# Patient Record
Sex: FEMALE | NOT HISPANIC OR LATINO | Employment: FULL TIME | URBAN - NONMETROPOLITAN AREA
[De-identification: names, ages, dates, MRNs, and addresses within clinical notes are randomized per-mention and may not be internally consistent; named-entity substitution may affect disease eponyms.]

---

## 2020-02-22 ENCOUNTER — OFFICE VISIT (OUTPATIENT)
Dept: URGENT CARE | Facility: PHYSICIAN GROUP | Age: 70
End: 2020-02-22
Payer: COMMERCIAL

## 2020-02-22 VITALS
DIASTOLIC BLOOD PRESSURE: 90 MMHG | BODY MASS INDEX: 25.48 KG/M2 | SYSTOLIC BLOOD PRESSURE: 130 MMHG | HEIGHT: 70 IN | HEART RATE: 87 BPM | OXYGEN SATURATION: 98 % | WEIGHT: 178 LBS | TEMPERATURE: 97.7 F

## 2020-02-22 DIAGNOSIS — J45.909 REACTIVE AIRWAY DISEASE WITHOUT COMPLICATION, UNSPECIFIED ASTHMA SEVERITY, UNSPECIFIED WHETHER PERSISTENT: ICD-10-CM

## 2020-02-22 PROCEDURE — 99203 OFFICE O/P NEW LOW 30 MIN: CPT | Performed by: FAMILY MEDICINE

## 2020-02-22 RX ORDER — ALBUTEROL SULFATE 90 UG/1
AEROSOL, METERED RESPIRATORY (INHALATION)
Qty: 1 INHALER | Refills: 2 | Status: SHIPPED | OUTPATIENT
Start: 2020-02-22 | End: 2023-03-15

## 2020-02-22 SDOH — HEALTH STABILITY: MENTAL HEALTH: HOW MANY STANDARD DRINKS CONTAINING ALCOHOL DO YOU HAVE ON A TYPICAL DAY?: 1 OR 2

## 2020-02-22 SDOH — HEALTH STABILITY: MENTAL HEALTH: HOW OFTEN DO YOU HAVE A DRINK CONTAINING ALCOHOL?: 4 OR MORE TIMES A WEEK

## 2020-02-22 NOTE — PROGRESS NOTES
Chief Complaint:    Chief Complaint   Patient presents with   • Cough     Pt sts they are going to be doing work on her house and are tearing it up and there is mold in there. pt sts her cough is from a possible allergy to the mold in her house.Pt sts she stayed in a Hotel yesterday and was breathing better.        History of Present Illness:    This is a new problem. 5 months ago, she moved into a new residence and she found out there is mold in the house. She has had shortness of breath and cough when in her residence but is better when she is away from area. Yesterday, she stayed in a motel and feels improved, and currently normal. No fever. Not coughing up purulent mucus. She reports the insurance will pay for her house to be renovated and she will not return to house until is completed. She feels an MDI may be helpful to have for prn use.      Review of Systems:    Constitutional: Negative for fever, chills, and diaphoresis.   Eyes: Negative for change in vision, photophobia, pain, redness, and discharge.  ENT: Negative for ear pain, ear discharge, hearing loss, tinnitus, nasal congestion, nosebleeds, and sore throat.    Respiratory: See HPI.  Cardiovascular: Negative for chest pain, palpitations, orthopnea, claudication, leg swelling, and PND.   Gastrointestinal: Negative for abdominal pain, nausea, vomiting, diarrhea, constipation, blood in stool, and melena.   Genitourinary: Negative for dysuria, urinary urgency, urinary frequency, hematuria, and flank pain.   Musculoskeletal: Negative for myalgias, joint pain, neck pain, and back pain.   Skin: Negative for rash and itching.   Neurological: Negative for dizziness, tingling, tremors, sensory change, speech change, focal weakness, seizures, loss of consciousness, and headaches.   Endo: Negative for polydipsia.   Heme: Does not bruise/bleed easily.   Psychiatric/Behavioral: Negative for depression, suicidal ideas, hallucinations, memory loss and substance  abuse. The patient is not nervous/anxious and does not have insomnia.        Past Medical History:    History reviewed. No pertinent past medical history.    Past Surgical History:    History reviewed. No pertinent surgical history.    Social History:    Social History     Socioeconomic History   • Marital status: Unknown     Spouse name: Not on file   • Number of children: Not on file   • Years of education: Not on file   • Highest education level: Not on file   Occupational History   • Not on file   Social Needs   • Financial resource strain: Not on file   • Food insecurity     Worry: Not on file     Inability: Not on file   • Transportation needs     Medical: Not on file     Non-medical: Not on file   Tobacco Use   • Smoking status: Current Every Day Smoker     Packs/day: 1.00     Types: Cigarettes   • Smokeless tobacco: Never Used   Substance and Sexual Activity   • Alcohol use: Yes     Frequency: 4 or more times a week     Drinks per session: 1 or 2   • Drug use: Never   • Sexual activity: Not on file   Lifestyle   • Physical activity     Days per week: Not on file     Minutes per session: Not on file   • Stress: Not on file   Relationships   • Social connections     Talks on phone: Not on file     Gets together: Not on file     Attends Christianity service: Not on file     Active member of club or organization: Not on file     Attends meetings of clubs or organizations: Not on file     Relationship status: Not on file   • Intimate partner violence     Fear of current or ex partner: Not on file     Emotionally abused: Not on file     Physically abused: Not on file     Forced sexual activity: Not on file   Other Topics Concern   • Not on file   Social History Narrative   • Not on file     Family History:    History reviewed. No pertinent family history.    Medications:    No current outpatient medications on file prior to visit.     No current facility-administered medications on file prior to visit.   "    Allergies:    No Known Allergies      Vitals:    Vitals:    02/22/20 1333   BP: 130/90   BP Location: Left arm   Patient Position: Sitting   BP Cuff Size: Adult   Pulse: 87   Temp: 36.5 °C (97.7 °F)   TempSrc: Temporal   SpO2: 98%   Weight: 80.7 kg (178 lb)   Height: 1.778 m (5' 10\")         Physical Exam:    Constitutional: Vital signs reviewed. Appears well-developed and well-nourished. No acute distress.   Eyes: Sclera white, conjunctivae clear. .  ENT: External ears normal. Hearing normal.   Neck: Neck supple.   Cardiovascular: Regular rate and rhythm. No murmur.  Pulmonary/Chest: Respirations non-labored. Clear to auscultation bilaterally.  Musculoskeletal: Normal gait. Normal range of motion. No muscular atrophy or weakness.  Neurological: Alert and oriented to person, place, and time. Muscle tone normal. Coordination normal.   Skin: No rashes or lesions. Warm, dry, normal turgor.  Psychiatric: Normal mood and affect. Behavior is normal. Judgment and thought content normal.       Assessment / Plan:    1. Reactive airway disease without complication, unspecified asthma severity, unspecified whether persistent  - albuterol 108 (90 Base) MCG/ACT Aero Soln inhalation aerosol; 2 PUFFS EVERY 4 HOURS ONLY IF NEEDED FOR COUGH, WHEEZING, OR SHORTNESS OF BREATH.  Dispense: 1 Inhaler; Refill: 2      Discussed with her DDX, management options, and risks, benefits, and alternatives to treatment plan agreed upon.    Agreeable to medication prescribed.    Discussed expected course of duration, time for improvement, and to seek follow-up in Emergency Room, urgent care, or with PCP if getting worse at any time or not improving within expected time frame.  "

## 2020-03-04 ENCOUNTER — OFFICE VISIT (OUTPATIENT)
Dept: URGENT CARE | Facility: PHYSICIAN GROUP | Age: 70
End: 2020-03-04
Payer: COMMERCIAL

## 2020-03-04 VITALS
BODY MASS INDEX: 25.48 KG/M2 | OXYGEN SATURATION: 98 % | RESPIRATION RATE: 16 BRPM | DIASTOLIC BLOOD PRESSURE: 78 MMHG | HEIGHT: 70 IN | TEMPERATURE: 98.5 F | WEIGHT: 178 LBS | HEART RATE: 96 BPM | SYSTOLIC BLOOD PRESSURE: 124 MMHG

## 2020-03-04 DIAGNOSIS — J22 LOWER RESP. TRACT INFECTION: ICD-10-CM

## 2020-03-04 DIAGNOSIS — R06.2 WHEEZING: ICD-10-CM

## 2020-03-04 PROCEDURE — 99214 OFFICE O/P EST MOD 30 MIN: CPT | Performed by: PHYSICIAN ASSISTANT

## 2020-03-04 RX ORDER — AZITHROMYCIN 250 MG/1
TABLET, FILM COATED ORAL
Qty: 6 TAB | Refills: 0 | Status: SHIPPED | OUTPATIENT
Start: 2020-03-04 | End: 2023-03-15

## 2020-03-04 RX ORDER — METHYLPREDNISOLONE 4 MG/1
TABLET ORAL
Qty: 21 TAB | Refills: 0 | Status: SHIPPED | OUTPATIENT
Start: 2020-03-04 | End: 2023-03-15

## 2020-03-04 NOTE — PROGRESS NOTES
Chief Complaint   Patient presents with   • Cough       HISTORY OF PRESENT ILLNESS: Patient is a 69 y.o. female who presents today for the following:    Patient comes in for evaluation of a 2-day history of cough.  She complains of mild shortness of breath and chills.  She has had a runny nose but denies headache, ear pain, sore throat, overt fever or body aches.  She has not taken any over-the-counter medication today.    There are no active problems to display for this patient.      Allergies:Patient has no known allergies.    Current Outpatient Medications Ordered in Epic   Medication Sig Dispense Refill   • methylPREDNISolone (MEDROL DOSEPAK) 4 MG Tablet Therapy Pack Use as package directs 21 Tab 0   • azithromycin (ZITHROMAX) 250 MG Tab Use as package directs. Fill if symptoms worsen at any point OR if they fail to improve over the next 7-10 days 6 Tab 0   • albuterol 108 (90 Base) MCG/ACT Aero Soln inhalation aerosol 2 PUFFS EVERY 4 HOURS ONLY IF NEEDED FOR COUGH, WHEEZING, OR SHORTNESS OF BREATH. 1 Inhaler 2     No current Epic-ordered facility-administered medications on file.        History reviewed. No pertinent past medical history.    Social History     Tobacco Use   • Smoking status: Current Every Day Smoker     Packs/day: 1.00     Types: Cigarettes   • Smokeless tobacco: Never Used   Substance Use Topics   • Alcohol use: Yes     Frequency: 4 or more times a week     Drinks per session: 1 or 2   • Drug use: Never       No family status information on file.   History reviewed. No pertinent family history.    Review of Systems:   Constitutional ROS: No unexpected change in weight, No weakness, No fatigue  Eye ROS: No recent significant change in vision, No eye pain, redness, discharge  Ear ROS: No drainage, No tinnitus or vertigo, No recent change in hearing  Mouth/Throat ROS: No teeth or gum problems, No bleeding gums, No tongue complaints  Neck ROS: No swollen glands, No significant pain in  "neck  Pulmonary ROS: No chronic cough, sputum, or hemoptysis, No dyspnea on exertion, No wheezing  Cardiovascular ROS: No diaphoresis, No edema, No palpitations  Musculoskeletal/Extremities ROS: No peripheral edema, No pain, redness or swelling on the joints  Hematologic/Lymphatic ROS: No chills, No night sweats, No weight loss  Skin/Integumentary ROS: No edema, No evidence of rash, No itching      Exam:  /78 (BP Location: Right arm, Patient Position: Sitting, BP Cuff Size: Adult)   Pulse 96   Temp 36.9 °C (98.5 °F) (Temporal)   Resp 16   Ht 1.778 m (5' 10\")   Wt 80.7 kg (178 lb)   SpO2 98%   General: Well developed, well nourished. No distress.    Eye: PERRL/EOMI; conjunctivae clear, lids normal.  ENMT: Lips without lesions, MMM. Oropharynx is clear. Bilateral TMs are within normal limits.  Pulmonary: Unlabored respiratory effort.  Diffuse faint expiratory wheezes  Cardiovascular: Regular rate and rhythm without murmur.   Neurologic: Grossly nonfocal. No facial asymmetry noted.  Lymph: No cervical lymphadenopathy noted.  Skin: Warm, dry, good turgor. No rashes in visible areas.   Psych: Normal mood. Alert and oriented to person, place and time.    Assessment/Plan:  Discussed likely viral etiology.  Vitals and exam unremarkable.  Low suspicion for pneumonia. Discussed appropriate over-the-counter symptomatic medication, and when to return to clinic. Follow up for worsening or persistent symptoms.  1. Lower resp. tract infection  azithromycin (ZITHROMAX) 250 MG Tab   2. Wheezing  methylPREDNISolone (MEDROL DOSEPAK) 4 MG Tablet Therapy Pack       "

## 2020-03-04 NOTE — LETTER
March 4, 2020         Patient: Marivel Enamorado   YOB: 1950   Date of Visit: 3/4/2020           To Whom it May Concern:    Marivel Enamorado was seen in my clinic on 3/4/2020. She may return to work on 3/5/2020.    If you have any questions or concerns, please don't hesitate to call.        Sincerely,           Debi Powers P.A.-C.  Electronically Signed

## 2020-05-14 ENCOUNTER — APPOINTMENT (RX ONLY)
Dept: URBAN - METROPOLITAN AREA CLINIC 4 | Facility: CLINIC | Age: 70
Setting detail: DERMATOLOGY
End: 2020-05-14

## 2020-05-14 PROBLEM — C44.91 BASAL CELL CARCINOMA OF SKIN, UNSPECIFIED: Status: ACTIVE | Noted: 2020-05-14

## 2020-05-14 PROCEDURE — ? MOHS SURGERY PHONE CONSULTATION

## 2020-05-14 NOTE — PROCEDURE: MOHS SURGERY PHONE CONSULTATION
Has The Pathology Report Been Received?: Yes
Has The Patient Ever Had A Joint Replaced?: No
Detail Level: Simple
Patient Reported Location: [facility]
Referring Provider: GLORIA STINSON MD
Which Antibiotic Do They Take For Surgical Prophylaxis?: Amoxicillin (2 grams)
Patient's Insurance: P PPO
Office Location Of Mohs Surgery: [facility]
Pathology Accession #: [id number]
Patient Preferred Phone Number: [phone]

## 2023-03-15 ENCOUNTER — TELEPHONE (OUTPATIENT)
Dept: URGENT CARE | Facility: PHYSICIAN GROUP | Age: 73
End: 2023-03-15

## 2023-03-15 ENCOUNTER — OFFICE VISIT (OUTPATIENT)
Dept: URGENT CARE | Facility: PHYSICIAN GROUP | Age: 73
End: 2023-03-15
Payer: COMMERCIAL

## 2023-03-15 VITALS
WEIGHT: 182 LBS | SYSTOLIC BLOOD PRESSURE: 132 MMHG | DIASTOLIC BLOOD PRESSURE: 88 MMHG | TEMPERATURE: 97.4 F | BODY MASS INDEX: 26.05 KG/M2 | HEART RATE: 88 BPM | RESPIRATION RATE: 16 BRPM | OXYGEN SATURATION: 97 % | HEIGHT: 70 IN

## 2023-03-15 DIAGNOSIS — H10.9 BACTERIAL CONJUNCTIVITIS OF RIGHT EYE: ICD-10-CM

## 2023-03-15 PROCEDURE — 99213 OFFICE O/P EST LOW 20 MIN: CPT | Performed by: NURSE PRACTITIONER

## 2023-03-15 RX ORDER — MOXIFLOXACIN 5 MG/ML
1 SOLUTION/ DROPS OPHTHALMIC 3 TIMES DAILY
Qty: 2 ML | Refills: 0 | Status: SHIPPED | OUTPATIENT
Start: 2023-03-15 | End: 2023-03-22

## 2023-03-15 ASSESSMENT — ENCOUNTER SYMPTOMS
EYE PAIN: 1
PHOTOPHOBIA: 0
HEADACHES: 0
FEVER: 0
EYE REDNESS: 1
DIZZINESS: 0
CHILLS: 0
SINUS PAIN: 0
EYE DISCHARGE: 1
DOUBLE VISION: 0
SORE THROAT: 0
BLURRED VISION: 1

## 2023-03-15 NOTE — TELEPHONE ENCOUNTER
Patient requesting a work note as she's concerned about infecting others, or if you feel she  is okay to return to work. Please advise

## 2023-03-15 NOTE — PROGRESS NOTES
Patient has consented to treatment and for use of patient information for treatment and billing purposes.    Chief Complaint:    Chief Complaint   Patient presents with    Eye Problem     X1 day R eye pain, redness, and watery discharge         History of Present Illness: 72 y.o.  female presents to clinic with 1 day history of right eye redness, burning pain, blurred vision, and watery discharge.    Her left eye is currently normal.   She denies any other history of eye issues, she denies any trauma or injury to the eye.  She does report that a coworker had similar symptoms last week.  Chest reports that she has had a chronic cough due to smoking.    Review of Systems   Constitutional:  Negative for chills and fever.   HENT:  Positive for congestion. Negative for sinus pain and sore throat.    Eyes:  Positive for blurred vision, pain, discharge and redness. Negative for double vision and photophobia.   Neurological:  Negative for dizziness and headaches.     Medications, Allergies, and current problem list reviewed today in Epic.    Physical Exam:    Vitals:    03/15/23 1250   BP: 132/88   Pulse: 88   Resp: 16   Temp: 36.3 °C (97.4 °F)   SpO2: 97%             Physical Exam  HENT:      Right Ear: Tympanic membrane, ear canal and external ear normal.      Left Ear: Tympanic membrane, ear canal and external ear normal.      Mouth/Throat:      Mouth: Mucous membranes are moist.   Eyes:      Conjunctiva/sclera:      Right eye: Right conjunctiva is injected. No exudate.     Left eye: Left conjunctiva is not injected. No exudate.  Cardiovascular:      Rate and Rhythm: Normal rate.   Pulmonary:      Effort: Pulmonary effort is normal.      Breath sounds: Rhonchi present.   Neurological:      Mental Status: She is alert.          Medical Decision Making:  I personally reviewed prior external notes and test results pertinent to today's visit.   Shared decision-making was utilized with patient did develop treatment plan and  clinic course. Jay, 72 y.o. female,  presents to clinic with 1 day history of right eye redness, burning pain, blurred vision, and watery discharge.    Her left eye is currently normal.   She denies any other history of eye issues, she denies any trauma or injury to the eye.  She does report that a coworker had similar symptoms last week.  Chest reports that she has had a chronic cough due to smoking.    Symptoms are consistent with bacterial conjunctivitis of right eye.  Discussed with patient good hand hygiene and to use warm washcloths for any matting that may appear.  Prescription of mofloxacin sent to pharmacy as Polytrim has been backordered for several weeks.    The patient remained stable during the urgent care visit.    Plan:  OTC Tylenol or Motrin for fever/discomfort.  Avoid touching eyes  Hand Hygiene   Follow-up with PCP  AVS printed  Return to clinic or go to the ED if symptoms worsen or fail to improve, or if patient should develop worsening/increasing/persistent eye redness, eye drainage, eye pain, eye itchiness, vision changes, periorbital redness or swelling, headache, fever/chills, and/or any concerning symptoms.      1. Bacterial conjunctivitis of right eye  - moxifloxacin (VIGAMOX) 0.5 % Solution; Administer 1 Drop into the right eye 3 times a day for 7 days.  Dispense: 2 mL; Refill: 0          Verbal and/or printed education was provided regarding the assessment and diagnosis.  All of the patient's questions were answered to their satisfaction at the time of discharge.    Follow up:    Recommended f/u in  7-10 if there is no improvement.    Patient was encouraged to monitor symptoms closely. Those signs and symptoms which would warrant concern and mandate seeking a higher level of service through the emergency department discussed at length and included in discharge papers.  Patient stated agreement and understanding of this plan of care.    Disposition:  Home in stable condition        Voice Recognition Disclaimer:  Portions of this document were created using voice recognition software. The software does have a chance of producing errors of grammar and possibly content. I have made every reasonable attempt to correct obvious errors, but there may be errors of grammar and possibly content that I did not discover before finalizing the documentation.

## 2023-03-15 NOTE — LETTER
[facility] U[facility] [place] [address]  [facility] [address]     March 15, 2023    Patient: Marivel Enamorado   YOB: 1950   Date of Visit: 3/15/2023       To Whom It May Concern:    Marivel Enamorado was seen and treated in our department on 3/15/2023.  She will need to be excused to work due to illness and may return on 3/17/2023 without restrictions    Sincerely,     VENESSA Ho.

## 2024-02-19 ENCOUNTER — OFFICE VISIT (OUTPATIENT)
Dept: URGENT CARE | Facility: PHYSICIAN GROUP | Age: 74
End: 2024-02-19
Payer: COMMERCIAL

## 2024-02-19 VITALS
OXYGEN SATURATION: 96 % | HEIGHT: 70 IN | DIASTOLIC BLOOD PRESSURE: 82 MMHG | SYSTOLIC BLOOD PRESSURE: 124 MMHG | TEMPERATURE: 97.7 F | RESPIRATION RATE: 18 BRPM | WEIGHT: 188 LBS | HEART RATE: 88 BPM | BODY MASS INDEX: 26.92 KG/M2

## 2024-02-19 DIAGNOSIS — L08.9: ICD-10-CM

## 2024-02-19 DIAGNOSIS — S80.819A: ICD-10-CM

## 2024-02-19 PROCEDURE — 99213 OFFICE O/P EST LOW 20 MIN: CPT | Performed by: NURSE PRACTITIONER

## 2024-02-19 PROCEDURE — 3079F DIAST BP 80-89 MM HG: CPT | Performed by: NURSE PRACTITIONER

## 2024-02-19 PROCEDURE — 3074F SYST BP LT 130 MM HG: CPT | Performed by: NURSE PRACTITIONER

## 2024-02-19 RX ORDER — SULFAMETHOXAZOLE AND TRIMETHOPRIM 800; 160 MG/1; MG/1
1 TABLET ORAL 2 TIMES DAILY
Qty: 14 TABLET | Refills: 0 | Status: SHIPPED | OUTPATIENT
Start: 2024-02-19 | End: 2024-02-26

## 2024-02-19 NOTE — LETTER
[facility] URGENT CARE [address] [place]  [facility] [address]     February 19, 2024    Patient: Marivel Enamorado   YOB: 1950   Date of Visit: 2/19/2024       To Whom It May Concern:    Marivel Enamorado was seen and treated in our department on 2/19/2024.     Sincerely,     VENESSA Loyola.

## 2024-02-19 NOTE — PROGRESS NOTES
"Subjective:   Marivel Enamorado is a 73 y.o. female who presents for Laceration (Left shin- cut on leg in [place]- redness in area. )      Patient is a 73-year-old female presents clinic today reporting 2 weeks ago she hit her left lower leg on a picnic table in [place] and it has not healed.  Over the past week she has had lower leg swelling, is become are tender to the touch, and has become erythematous.  She continues to have a wound in the center that will not heal.  She has been placing Neosporin on it.    Last Tdap was 5/2021  She denies any fevers, chills, body aches, nausea, or vomiting.    Medications, Allergies, and current problem list reviewed today in Epic.     Objective:     /82   Pulse 88   Temp 36.5 °C (97.7 °F) (Temporal)   Resp 18   Ht 1.778 m (5' 10\")   Wt 85.3 kg (188 lb)   SpO2 96%     Physical Exam  Vitals reviewed.   Constitutional:       Appearance: Normal appearance.   HENT:      Head: Normocephalic.      Nose: Nose normal.      Mouth/Throat:      Mouth: Mucous membranes are moist.   Eyes:      Extraocular Movements: Extraocular movements intact.      Conjunctiva/sclera: Conjunctivae normal.      Pupils: Pupils are equal, round, and reactive to light.   Cardiovascular:      Rate and Rhythm: Normal rate.   Pulmonary:      Effort: Pulmonary effort is normal.   Musculoskeletal:         General: Normal range of motion.      Cervical back: Normal range of motion and neck supple.   Skin:     General: Skin is warm and dry.      Findings: Abrasion, erythema and signs of injury present. No ecchymosis.             Comments: Left shin: 2 cm in circumference abrasion with scabbing present.  Large area of erythema surrounding wound.  It is warm to the touch  1+ lower leg edema.  Pedal pulse 2+.  Ambulates with normal gait.   Neurological:      Mental Status: She is alert and oriented to person, place, and time.   Psychiatric:         Mood and Affect: Mood normal.         Behavior: Behavior normal.  "        Thought Content: Thought content normal.         Judgment: Judgment normal.         Assessment/Plan:     Diagnosis and associated orders:     1. Abrasion of lower leg with infection, initial encounter  sulfamethoxazole-trimethoprim (BACTRIM DS) 800-160 MG tablet         Comments/MDM:     This patient presents with initial presentation of local erythema, warmth, swelling concerning for cellulitis secondary to abrasion.  Sensitivity/pain to light touch around the erythematous area.  No lymphangitic spread visible and no fluid pockets or fluctuance c/f abscess noted.  Low c/f osteomyelitis or DVT.  Disposition: No evidence of serious bacterial illness requiring admission for higher level of care. Nontoxic appearing, VSS. Low risk for treatment failure based on history and she is stable for outpatient treatment. Will discharge home with PO antibiotics and return precautions discussed at bedside.  Patient was involved with shared decision-making throughout the exam today and verbalizes understanding regards to plan of care, discharge instructions, and follow-up         Differential diagnosis, natural history, supportive care, and indications for immediate follow-up discussed.    Advised the patient to follow-up with the primary care physician for recheck, reevaluation, and consideration of further management.    I personally reviewed prior external notes and test results pertinent to today's visit as well as additional imaging and testing completed in clinic today.     Please note that this dictation was created using voice recognition software. I have made a reasonable attempt to correct obvious errors, but I expect that there are errors of grammar and possibly content that I did not discover before finalizing the note.

## 2024-02-21 ENCOUNTER — OFFICE VISIT (OUTPATIENT)
Dept: URGENT CARE | Facility: PHYSICIAN GROUP | Age: 74
End: 2024-02-21
Payer: COMMERCIAL

## 2024-02-21 VITALS
RESPIRATION RATE: 16 BRPM | WEIGHT: 188 LBS | TEMPERATURE: 98.3 F | DIASTOLIC BLOOD PRESSURE: 80 MMHG | OXYGEN SATURATION: 95 % | BODY MASS INDEX: 26.92 KG/M2 | SYSTOLIC BLOOD PRESSURE: 118 MMHG | HEART RATE: 85 BPM | HEIGHT: 70 IN

## 2024-02-21 DIAGNOSIS — S80.819A: ICD-10-CM

## 2024-02-21 DIAGNOSIS — L08.9: ICD-10-CM

## 2024-02-21 PROCEDURE — 3074F SYST BP LT 130 MM HG: CPT | Performed by: NURSE PRACTITIONER

## 2024-02-21 PROCEDURE — 3079F DIAST BP 80-89 MM HG: CPT | Performed by: NURSE PRACTITIONER

## 2024-02-21 PROCEDURE — 99213 OFFICE O/P EST LOW 20 MIN: CPT | Performed by: NURSE PRACTITIONER

## 2024-02-21 RX ORDER — DOXYCYCLINE HYCLATE 100 MG
100 TABLET ORAL 2 TIMES DAILY
Qty: 20 TABLET | Refills: 0 | Status: CANCELLED | OUTPATIENT
Start: 2024-02-21 | End: 2024-03-02

## 2024-02-21 RX ORDER — CEFDINIR 300 MG/1
300 CAPSULE ORAL 2 TIMES DAILY
Qty: 14 CAPSULE | Refills: 0 | Status: SHIPPED | OUTPATIENT
Start: 2024-02-21 | End: 2024-02-28

## 2024-02-21 NOTE — PROGRESS NOTES
"Subjective:   Marivel Enamorado is a 73 y.o. female who presents for Leg Pain (Left shin- not getting better since Monday. Redness spreading. Swelling. )    Patient is a 73-year-old female who presents to clinic today for ongoing cellulitis to left lower leg with scabbed over wounds secondary to hitting it on a picnic table 2 weeks ago and [place].  She was seen on 2/19/2024 and started on Bactrim, she has been tolerating this medication well and is only back in clinic if symptoms have not improved.  She believes that the redness has slightly worsened and she has mildly more swelling present.  She is able to ambulate on it however it does cause some pain.  She has not had any fevers, chills, body aches, nausea, or vomiting.    Medications, Allergies, and current problem list reviewed today in Epic.     Objective:     /80   Pulse 85   Temp 36.8 °C (98.3 °F) (Temporal)   Resp 16   Ht 1.778 m (5' 10\")   Wt 85.3 kg (188 lb)   SpO2 95%     Physical Exam  Vitals reviewed.   Constitutional:       General: She is not in acute distress.     Appearance: Normal appearance. She is not ill-appearing or toxic-appearing.   HENT:      Head: Normocephalic.      Nose: Nose normal.      Mouth/Throat:      Mouth: Mucous membranes are moist.   Eyes:      Extraocular Movements: Extraocular movements intact.      Conjunctiva/sclera: Conjunctivae normal.      Pupils: Pupils are equal, round, and reactive to light.   Cardiovascular:      Rate and Rhythm: Normal rate.   Pulmonary:      Effort: Pulmonary effort is normal.   Musculoskeletal:         General: Normal range of motion.      Cervical back: Normal range of motion and neck supple.   Skin:     General: Skin is warm and dry.      Comments: Left shin: 2 cm in circumference abrasion with scabbing present.  Large area of erythema surrounding wound.  It is warm to the touch  1+ lower leg edema.  Negative for discharge or drainage.  No pustules or vesicles.  Negative for " weeping.  Pedal pulse 2+.  Ambulates with normal gait.     Neurological:      Mental Status: She is alert and oriented to person, place, and time.   Psychiatric:         Mood and Affect: Mood normal.         Behavior: Behavior normal.         Thought Content: Thought content normal.         Judgment: Judgment normal.         Assessment/Plan:     Diagnosis and associated orders:     1. Abrasion of lower leg with infection, initial encounter  cefTRIAXone (Rocephin) 1 g in lidocaine (Xylocaine) 1 % 4 mL for IM use    cefdinir (OMNICEF) 300 MG Cap         Comments/MDM:     This acute condition.  Patient continues to have cellulitis secondary to abrasion on left lower leg.  Erythematous area was outlined with marker and patient was instructed to monitor.  Patient will continue with Bactrim.  Patient was given Rocephin injection in clinic and started on cefdinir.  Side effects medication discussed.  No systemic symptoms at this time.  No signs of sepsis.    Education was provided to patient in regards to when to follow-up in clinic versus emergency department.  Patient was involved with shared decision-making throughout the exam today and verbalizes understanding regards to plan of care, discharge instructions, and follow-up         Differential diagnosis, natural history, supportive care, and indications for immediate follow-up discussed.    Advised the patient to follow-up with the primary care physician for recheck, reevaluation, and consideration of further management.    I personally reviewed prior external notes and test results pertinent to today's visit as well as additional imaging and testing completed in clinic today.     Please note that this dictation was created using voice recognition software. I have made a reasonable attempt to correct obvious errors, but I expect that there are errors of grammar and possibly content that I did not discover before finalizing the note.

## 2024-04-10 SDOH — HEALTH STABILITY: PHYSICAL HEALTH: ON AVERAGE, HOW MANY DAYS PER WEEK DO YOU ENGAGE IN MODERATE TO STRENUOUS EXERCISE (LIKE A BRISK WALK)?: 0 DAYS

## 2024-04-10 SDOH — HEALTH STABILITY: MENTAL HEALTH
STRESS IS WHEN SOMEONE FEELS TENSE, NERVOUS, ANXIOUS, OR CAN'T SLEEP AT NIGHT BECAUSE THEIR MIND IS TROUBLED. HOW STRESSED ARE YOU?: TO SOME EXTENT

## 2024-04-10 SDOH — HEALTH STABILITY: PHYSICAL HEALTH: ON AVERAGE, HOW MANY MINUTES DO YOU ENGAGE IN EXERCISE AT THIS LEVEL?: 0 MIN

## 2024-04-10 SDOH — ECONOMIC STABILITY: HOUSING INSECURITY
IN THE LAST 12 MONTHS, WAS THERE A TIME WHEN YOU DID NOT HAVE A STEADY PLACE TO SLEEP OR SLEPT IN A SHELTER (INCLUDING NOW)?: NO

## 2024-04-10 SDOH — ECONOMIC STABILITY: INCOME INSECURITY: HOW HARD IS IT FOR YOU TO PAY FOR THE VERY BASICS LIKE FOOD, HOUSING, MEDICAL CARE, AND HEATING?: SOMEWHAT HARD

## 2024-04-10 SDOH — ECONOMIC STABILITY: FOOD INSECURITY: WITHIN THE PAST 12 MONTHS, THE FOOD YOU BOUGHT JUST DIDN'T LAST AND YOU DIDN'T HAVE MONEY TO GET MORE.: NEVER TRUE

## 2024-04-10 SDOH — ECONOMIC STABILITY: FOOD INSECURITY: WITHIN THE PAST 12 MONTHS, YOU WORRIED THAT YOUR FOOD WOULD RUN OUT BEFORE YOU GOT MONEY TO BUY MORE.: NEVER TRUE

## 2024-04-10 SDOH — ECONOMIC STABILITY: INCOME INSECURITY: IN THE LAST 12 MONTHS, WAS THERE A TIME WHEN YOU WERE NOT ABLE TO PAY THE MORTGAGE OR RENT ON TIME?: YES

## 2024-04-10 SDOH — ECONOMIC STABILITY: TRANSPORTATION INSECURITY
IN THE PAST 12 MONTHS, HAS THE LACK OF TRANSPORTATION KEPT YOU FROM MEDICAL APPOINTMENTS OR FROM GETTING MEDICATIONS?: NO

## 2024-04-10 SDOH — ECONOMIC STABILITY: HOUSING INSECURITY
IN THE LAST 12 MONTHS, WAS THERE A TIME WHEN YOU DID NOT HAVE A STEADY PLACE TO SLEEP OR SLEPT IN A SHELTER (INCLUDING NOW)?: YES

## 2024-04-10 SDOH — ECONOMIC STABILITY: HOUSING INSECURITY: IN THE LAST 12 MONTHS, HOW MANY PLACES HAVE YOU LIVED?: 1

## 2024-04-10 SDOH — ECONOMIC STABILITY: TRANSPORTATION INSECURITY
IN THE PAST 12 MONTHS, HAS LACK OF RELIABLE TRANSPORTATION KEPT YOU FROM MEDICAL APPOINTMENTS, MEETINGS, WORK OR FROM GETTING THINGS NEEDED FOR DAILY LIVING?: NO

## 2024-04-10 SDOH — ECONOMIC STABILITY: TRANSPORTATION INSECURITY
IN THE PAST 12 MONTHS, HAS LACK OF TRANSPORTATION KEPT YOU FROM MEETINGS, WORK, OR FROM GETTING THINGS NEEDED FOR DAILY LIVING?: NO

## 2024-04-10 ASSESSMENT — SOCIAL DETERMINANTS OF HEALTH (SDOH)
WITHIN THE PAST 12 MONTHS, YOU WORRIED THAT YOUR FOOD WOULD RUN OUT BEFORE YOU GOT THE MONEY TO BUY MORE: NEVER TRUE
HOW HARD IS IT FOR YOU TO PAY FOR THE VERY BASICS LIKE FOOD, HOUSING, MEDICAL CARE, AND HEATING?: SOMEWHAT HARD
IN A TYPICAL WEEK, HOW MANY TIMES DO YOU TALK ON THE PHONE WITH FAMILY, FRIENDS, OR NEIGHBORS?: MORE THAN THREE TIMES A WEEK
HOW OFTEN DO YOU ATTEND CHURCH OR RELIGIOUS SERVICES?: MORE THAN 4 TIMES PER YEAR
HOW OFTEN DO YOU GET TOGETHER WITH FRIENDS OR RELATIVES?: MORE THAN THREE TIMES A WEEK
IN A TYPICAL WEEK, HOW MANY TIMES DO YOU TALK ON THE PHONE WITH FAMILY, FRIENDS, OR NEIGHBORS?: MORE THAN THREE TIMES A WEEK
HOW OFTEN DO YOU GET TOGETHER WITH FRIENDS OR RELATIVES?: MORE THAN THREE TIMES A WEEK
DO YOU BELONG TO ANY CLUBS OR ORGANIZATIONS SUCH AS CHURCH GROUPS UNIONS, FRATERNAL OR ATHLETIC GROUPS, OR SCHOOL GROUPS?: PATIENT DECLINED
HOW OFTEN DO YOU HAVE SIX OR MORE DRINKS ON ONE OCCASION: NEVER
HOW OFTEN DO YOU ATTEND CHURCH OR RELIGIOUS SERVICES?: MORE THAN 4 TIMES PER YEAR
HOW OFTEN DO YOU HAVE A DRINK CONTAINING ALCOHOL: 4 OR MORE TIMES A WEEK
HOW OFTEN DO YOU ATTENT MEETINGS OF THE CLUB OR ORGANIZATION YOU BELONG TO?: PATIENT DECLINED
HOW OFTEN DO YOU ATTENT MEETINGS OF THE CLUB OR ORGANIZATION YOU BELONG TO?: PATIENT DECLINED
HOW MANY DRINKS CONTAINING ALCOHOL DO YOU HAVE ON A TYPICAL DAY WHEN YOU ARE DRINKING: 1 OR 2
DO YOU BELONG TO ANY CLUBS OR ORGANIZATIONS SUCH AS CHURCH GROUPS UNIONS, FRATERNAL OR ATHLETIC GROUPS, OR SCHOOL GROUPS?: PATIENT DECLINED

## 2024-04-10 ASSESSMENT — LIFESTYLE VARIABLES
AUDIT-C TOTAL SCORE: 4
HOW OFTEN DO YOU HAVE SIX OR MORE DRINKS ON ONE OCCASION: NEVER
HOW OFTEN DO YOU HAVE A DRINK CONTAINING ALCOHOL: 4 OR MORE TIMES A WEEK
SKIP TO QUESTIONS 9-10: 1
HOW MANY STANDARD DRINKS CONTAINING ALCOHOL DO YOU HAVE ON A TYPICAL DAY: 1 OR 2

## 2024-04-11 ENCOUNTER — OFFICE VISIT (OUTPATIENT)
Dept: MEDICAL GROUP | Facility: PHYSICIAN GROUP | Age: 74
End: 2024-04-11
Payer: COMMERCIAL

## 2024-04-11 VITALS
DIASTOLIC BLOOD PRESSURE: 82 MMHG | OXYGEN SATURATION: 96 % | BODY MASS INDEX: 26.84 KG/M2 | HEIGHT: 70 IN | SYSTOLIC BLOOD PRESSURE: 124 MMHG | RESPIRATION RATE: 18 BRPM | TEMPERATURE: 98.5 F | HEART RATE: 91 BPM | WEIGHT: 187.5 LBS

## 2024-04-11 DIAGNOSIS — Z12.11 SCREENING FOR COLORECTAL CANCER: ICD-10-CM

## 2024-04-11 DIAGNOSIS — K59.09 CHRONIC CONSTIPATION: ICD-10-CM

## 2024-04-11 DIAGNOSIS — L98.9 SKIN LESIONS: ICD-10-CM

## 2024-04-11 DIAGNOSIS — Z12.12 SCREENING FOR COLORECTAL CANCER: ICD-10-CM

## 2024-04-11 DIAGNOSIS — N95.1 MENOPAUSAL STATE: ICD-10-CM

## 2024-04-11 DIAGNOSIS — C44.310 BASAL CELL CARCINOMA (BCC) OF FACE: ICD-10-CM

## 2024-04-11 DIAGNOSIS — Z85.42 HISTORY OF UTERINE CANCER: ICD-10-CM

## 2024-04-11 DIAGNOSIS — Z13.220 SCREENING FOR LIPID DISORDERS: ICD-10-CM

## 2024-04-11 DIAGNOSIS — Z12.31 ENCOUNTER FOR SCREENING MAMMOGRAM FOR BREAST CANCER: ICD-10-CM

## 2024-04-11 DIAGNOSIS — F17.210 NICOTINE DEPENDENCE, CIGARETTES, UNCOMPLICATED: ICD-10-CM

## 2024-04-11 DIAGNOSIS — M06.9 RHEUMATOID ARTHRITIS INVOLVING BOTH HANDS, UNSPECIFIED WHETHER RHEUMATOID FACTOR PRESENT (HCC): ICD-10-CM

## 2024-04-11 DIAGNOSIS — Z78.0 POSTMENOPAUSAL STATUS (AGE-RELATED) (NATURAL): ICD-10-CM

## 2024-04-11 DIAGNOSIS — R22.42 LOCALIZED SWELLING OF LEFT LOWER LEG: ICD-10-CM

## 2024-04-11 DIAGNOSIS — F17.200 TOBACCO USE DISORDER: ICD-10-CM

## 2024-04-11 DIAGNOSIS — Z12.2 SCREENING FOR LUNG CANCER: ICD-10-CM

## 2024-04-11 PROCEDURE — 99204 OFFICE O/P NEW MOD 45 MIN: CPT | Performed by: NURSE PRACTITIONER

## 2024-04-11 PROCEDURE — 3079F DIAST BP 80-89 MM HG: CPT | Performed by: NURSE PRACTITIONER

## 2024-04-11 PROCEDURE — 3074F SYST BP LT 130 MM HG: CPT | Performed by: NURSE PRACTITIONER

## 2024-04-11 RX ORDER — SENNA AND DOCUSATE SODIUM 50; 8.6 MG/1; MG/1
1 TABLET, FILM COATED ORAL DAILY
Qty: 30 TABLET | Refills: 11 | Status: SHIPPED | OUTPATIENT
Start: 2024-04-11

## 2024-04-11 ASSESSMENT — ENCOUNTER SYMPTOMS
HEADACHES: 0
DIARRHEA: 0
FEVER: 0
VOMITING: 0
DIZZINESS: 0
CONSTIPATION: 1
WEIGHT LOSS: 0
BLOOD IN STOOL: 0
NAUSEA: 0
PALPITATIONS: 0
CHILLS: 0
SHORTNESS OF BREATH: 0
ABDOMINAL PAIN: 1
EYES NEGATIVE: 1
MUSCULOSKELETAL NEGATIVE: 1

## 2024-04-11 ASSESSMENT — PATIENT HEALTH QUESTIONNAIRE - PHQ9: CLINICAL INTERPRETATION OF PHQ2 SCORE: 0

## 2024-04-11 NOTE — PROGRESS NOTES
Verbal consent was acquired by the patient to use a[employer] ambient listening note generation during this visit     CC:  Chief Complaint   Patient presents with    GI Problem     Constipation, pain in stomach, rigid stomach, stabbing pain x1 week        Marivel Enamorado 73 y.o. female  patient presenting for     History of Present Illness  The patient is a 73-year-old female who is coming in today to establish care as well as follow up on some stomach pains that she has been having for the last week.    Chronic constipation   The patient reports chronic constipation, with periods of amenorrhea lasting up to one month. She experienced a bowel movement last Friday morning, but by 11:00 PM, she began experiencing severe abdominal pain, which progressively worsened until midnight. She administered Vicodin from a friend, which has since resolved the pain, but she describes her abdomen as feeling raw and swollen. She denies experiencing nausea, vomiting, diarrhea, hematochezia, chills, fevers, weight loss, fatigue, chest pain, shortness of breath, urinary issues, dizziness, headaches, or body aches. However, she has experienced weight gain. She has not sought treatment for constipation for the past 20 years. She underwent a colonoscopy at age 52, which yielded normal results.    Alcohol use  She ceased alcohol consumption following the onset of her abdominal pain, suspecting pancreatitis.     Left lower extremity swelling  The patient has been experiencing intermittent left leg swelling for several years. She denies any foot or leg pain during ambulation, but reports having varicose veins. She consumes a high-sodium diet. The patient's leg swelling is exacerbated by yard work.    Skin lesions  The patient has three skin lesions on her left lower leg that have been present for several months. The lesions are pruritic but not painful.    RA bilateral hands  The patient has a history of rheumatoid arthritis, primarily  "affecting her fingers, diagnosed 25 years ago with osteoporosis. She was previously on various medications, but discontinued them due to associated swelling. She has learned to manage her pain without success.     History of uterine cancer/hysterectomy   The patient has a history of uterine cancer and had a hysterectomy in . She has not had a Pap smear in 20 years. She has had one pregnancy and one  at age 20.    BCC  The patient has a history of basal cell carcinoma on the right side of her face. She was scheduled for surgery prior to the COVID- pandemic, which was cancelled and she has not followed up.    Tobacco use   She has been smoking a pack a day since she was 20. She drinks 3 cocktails every night. She denies drug use.        Review of Systems   Constitutional:  Negative for chills, fever, malaise/fatigue and weight loss.   HENT: Negative.     Eyes: Negative.    Respiratory:  Negative for shortness of breath.    Cardiovascular:  Positive for leg swelling. Negative for chest pain and palpitations.        Left leg swelling   Gastrointestinal:  Positive for abdominal pain and constipation. Negative for blood in stool, diarrhea, nausea and vomiting.   Genitourinary: Negative.    Musculoskeletal: Negative.    Skin:  Positive for itching.        3 skins to left leg for a couple months   Neurological:  Negative for dizziness and headaches.       /82   Pulse 91   Temp 36.9 °C (98.5 °F) (Temporal)   Resp 18   Ht 1.778 m (5' 10\")   Wt 85 kg (187 lb 8 oz)   SpO2 96% , Body mass index is 26.9 kg/m².    Physical Exam  Constitutional:       General: She is not in acute distress.     Appearance: Normal appearance. She is not ill-appearing.   HENT:      Head: Normocephalic and atraumatic.      Right Ear: Tympanic membrane, ear canal and external ear normal.      Left Ear: Tympanic membrane, ear canal and external ear normal.      Nose: Nose normal.      Mouth/Throat:      Mouth: Mucous membranes " are moist.      Pharynx: Oropharynx is clear.   Eyes:      Extraocular Movements: Extraocular movements intact.      Conjunctiva/sclera: Conjunctivae normal.      Pupils: Pupils are equal, round, and reactive to light.   Cardiovascular:      Rate and Rhythm: Normal rate and regular rhythm.      Pulses: Normal pulses.      Heart sounds: Normal heart sounds.   Pulmonary:      Effort: Pulmonary effort is normal.      Breath sounds: Normal breath sounds.   Abdominal:      General: Bowel sounds are normal. There is distension.      Palpations: Abdomen is soft. There is no mass.      Tenderness: There is no abdominal tenderness. There is no guarding or rebound.      Hernia: No hernia is present.   Musculoskeletal:         General: Normal range of motion.      Cervical back: Normal range of motion and neck supple. No tenderness.   Lymphadenopathy:      Cervical: No cervical adenopathy.   Skin:     General: Skin is warm and dry.      Capillary Refill: Capillary refill takes less than 2 seconds.          Neurological:      Mental Status: She is alert and oriented to person, place, and time.   Psychiatric:         Mood and Affect: Mood normal.         Behavior: Behavior normal.         Thought Content: Thought content normal.         Judgment: Judgment normal.           Results      Assessment and Plan    Assessment & Plan  1. Chronic constipation.  Chronic and stable condition   An abdominal ultrasound will be conducted. The patient will commence a regimen of over-the-counter Metamucil and MiraLAX, which can be purchased at the pharmacy. Additionally, senna and docusate have been ordered.  Referral to GI    2. Rheumatoid arthritis in both hands.  Chronic and stable condition   A hand survey will be ordered. New lab tests and an x-ray have been ordered to determine if a referral to a rheumatologist is necessary.    3. Lower extremity swelling.  Chronic and stable condition   There are no indications of DVT or associated pain.  The patient is advised to elevate her legs and limit her salt intake.    4. Skin lesions on the left leg.  Chronic and stable condition   A referral to dermatology will be made.    5. History of uterine cancer.  Chronic and stable condition   A Pap smear will be scheduled for the patient.    6. Tobacco use disorder.  Chronic and stable condition   A low-dose lung cancer screening will be ordered. The patient has expressed no interest in cessation at this time.    7. Screening for osteoporosis.  Chronic and stable condition   . A DEXA scan will be ordered.    8. Screening for colorectal cancer.  Chronic and stable condition   A referral to GI for a colonoscopy will be made.    9. Screening for mammogram.  Chronic and stable condition   A mammogram has been ordered. Regular blood tests to screen for any lipid disorders will be ordered.       1. Rheumatoid arthritis involving both hands, unspecified whether rheumatoid factor present (HCC)  CCP ANTIBODIES, IGG/IGA    RHEUMATOID ARTHRITIS FACTOR    DX-JOINT SURVEY-HANDS SINGLE VIEW    CBC WITHOUT DIFFERENTIAL    FREE THYROXINE    TSH    Comp Metabolic Panel      2. Localized swelling of left lower leg  CBC WITHOUT DIFFERENTIAL    FREE THYROXINE    TSH      3. Skin lesions  Referral to Dermatology      4. History of uterine cancer        5. Tobacco use disorder  REFERRAL TO LUNG CANCER SCREENING PROGRAM      6. Postmenopausal status (age-related) (natural)  DS-BONE DENSITY STUDY (DEXA)      7. Menopausal state  DS-BONE DENSITY STUDY (DEXA)      8. Screening for colorectal cancer  Referral to GI for Colonoscopy      9. Encounter for screening mammogram for breast cancer  MA-SCREENING MAMMO BILAT W/ IMPLANTS W/CAD      10. Screening for lipid disorders  Lipid Profile      11. Nicotine dependence, uncomplicated (Current Smoker)  REFERRAL TO LUNG CANCER SCREENING PROGRAM      12. Screening for lung cancer  REFERRAL TO LUNG CANCER SCREENING PROGRAM      13. Chronic  constipation  US-ABDOMEN COMPLETE SURVEY    sennosides-docusate sodium (SENOKOT-S) 8.6-50 MG tablet      14. Basal cell carcinoma (BCC) of face  Referral to Dermatology           Discussed with patient possible alternative diagnoses, patient is to take all medications as prescribed.     If symptoms persist FU w/PCP, if symptoms worsen go to emergency room.     If experiencing any side effects from prescribed medications reports to the office immediately or go to emergency room.    Reviewed indication, dosage, usage and potential adverse effects of prescribed medications.     Reviewed risks and benefits of treatment plan. Patient verbalizes understanding of all instruction and verbally agrees to plan.    No follow-ups on file.    This note was created using voice recognition software ([employer]). The accuracy of the dictation is limited by the abilities of the software. I have reviewed the note prior to signing, however some errors in grammar and context are still possible. If you have any questions related to this note please do not hesitate to contact our office.

## 2024-04-11 NOTE — PATIENT INSTRUCTIONS
Referrals-   Look on Mychart for contact information for dermatology and GI colon    Xray hands-   G to [facility] or [facility] to get completed to see how RA is doing    Ultra sound abdomen  Call Quincy Mcgowan to schedule at [phone]    Constipation   Start metamucil then start senna docusate then start miralox  Hold for loose stools  Return to clinic for worsening symptoms     Mammogram and Bone Density scan   Schedule with Quincy Mcgowan [phone]    Labs   Completed labs at [facility]

## 2024-04-12 ENCOUNTER — APPOINTMENT (OUTPATIENT)
Dept: RADIOLOGY | Facility: IMAGING CENTER | Age: 74
End: 2024-04-12
Attending: NURSE PRACTITIONER
Payer: COMMERCIAL

## 2024-04-12 ENCOUNTER — NON-PROVIDER VISIT (OUTPATIENT)
Dept: URGENT CARE | Facility: PHYSICIAN GROUP | Age: 74
End: 2024-04-12
Payer: COMMERCIAL

## 2024-04-12 ENCOUNTER — HOSPITAL ENCOUNTER (OUTPATIENT)
Dept: LAB | Facility: MEDICAL CENTER | Age: 74
End: 2024-04-12
Attending: NURSE PRACTITIONER
Payer: COMMERCIAL

## 2024-04-12 DIAGNOSIS — M06.9 RHEUMATOID ARTHRITIS INVOLVING BOTH HANDS, UNSPECIFIED WHETHER RHEUMATOID FACTOR PRESENT (HCC): ICD-10-CM

## 2024-04-12 DIAGNOSIS — Z13.220 SCREENING FOR LIPID DISORDERS: ICD-10-CM

## 2024-04-12 DIAGNOSIS — R22.42 LOCALIZED SWELLING OF LEFT LOWER LEG: ICD-10-CM

## 2024-04-12 LAB
ALBUMIN SERPL BCP-MCNC: 4.4 G/DL (ref 3.2–4.9)
ALBUMIN/GLOB SERPL: 1.5 G/DL
ALP SERPL-CCNC: 127 U/L (ref 30–99)
ALT SERPL-CCNC: 14 U/L (ref 2–50)
ANION GAP SERPL CALC-SCNC: 12 MMOL/L (ref 7–16)
AST SERPL-CCNC: 16 U/L (ref 12–45)
BILIRUB SERPL-MCNC: 0.5 MG/DL (ref 0.1–1.5)
BUN SERPL-MCNC: 14 MG/DL (ref 8–22)
CALCIUM ALBUM COR SERPL-MCNC: 9.1 MG/DL (ref 8.5–10.5)
CALCIUM SERPL-MCNC: 9.4 MG/DL (ref 8.5–10.5)
CHLORIDE SERPL-SCNC: 102 MMOL/L (ref 96–112)
CHOLEST SERPL-MCNC: 214 MG/DL (ref 100–199)
CO2 SERPL-SCNC: 27 MMOL/L (ref 20–33)
CREAT SERPL-MCNC: 0.78 MG/DL (ref 0.5–1.4)
ERYTHROCYTE [DISTWIDTH] IN BLOOD BY AUTOMATED COUNT: 44.3 FL (ref 35.9–50)
FASTING STATUS PATIENT QL REPORTED: NORMAL
GFR SERPLBLD CREATININE-BSD FMLA CKD-EPI: 81 ML/MIN/1.73 M 2
GLOBULIN SER CALC-MCNC: 2.9 G/DL (ref 1.9–3.5)
GLUCOSE SERPL-MCNC: 83 MG/DL (ref 65–99)
HCT VFR BLD AUTO: 41.7 % (ref 37–47)
HDLC SERPL-MCNC: 53 MG/DL
HGB BLD-MCNC: 13.6 G/DL (ref 12–16)
LDLC SERPL CALC-MCNC: 139 MG/DL
MCH RBC QN AUTO: 29.4 PG (ref 27–33)
MCHC RBC AUTO-ENTMCNC: 32.6 G/DL (ref 32.2–35.5)
MCV RBC AUTO: 90.1 FL (ref 81.4–97.8)
PLATELET # BLD AUTO: 299 K/UL (ref 164–446)
PMV BLD AUTO: 9.2 FL (ref 9–12.9)
POTASSIUM SERPL-SCNC: 3.3 MMOL/L (ref 3.6–5.5)
PROT SERPL-MCNC: 7.3 G/DL (ref 6–8.2)
RBC # BLD AUTO: 4.63 M/UL (ref 4.2–5.4)
RHEUMATOID FACT SER IA-ACNC: 10 IU/ML (ref 0–14)
SODIUM SERPL-SCNC: 141 MMOL/L (ref 135–145)
T4 FREE SERPL-MCNC: 0.86 NG/DL (ref 0.93–1.7)
TRIGL SERPL-MCNC: 108 MG/DL (ref 0–149)
TSH SERPL DL<=0.005 MIU/L-ACNC: 12.8 UIU/ML (ref 0.38–5.33)
WBC # BLD AUTO: 4.7 K/UL (ref 4.8–10.8)

## 2024-04-12 PROCEDURE — 84443 ASSAY THYROID STIM HORMONE: CPT | Mod: GA

## 2024-04-12 PROCEDURE — 84439 ASSAY OF FREE THYROXINE: CPT | Mod: GA

## 2024-04-12 PROCEDURE — 86200 CCP ANTIBODY: CPT

## 2024-04-12 PROCEDURE — 80061 LIPID PANEL: CPT | Mod: GA

## 2024-04-12 PROCEDURE — 86431 RHEUMATOID FACTOR QUANT: CPT

## 2024-04-12 PROCEDURE — 36415 COLL VENOUS BLD VENIPUNCTURE: CPT

## 2024-04-12 PROCEDURE — 77077 JOINT SURVEY SINGLE VIEW: CPT | Mod: TC,FY | Performed by: RADIOLOGY

## 2024-04-12 PROCEDURE — 80053 COMPREHEN METABOLIC PANEL: CPT

## 2024-04-12 PROCEDURE — 85027 COMPLETE CBC AUTOMATED: CPT

## 2024-04-14 LAB — CCP IGA+IGG SERPL IA-ACNC: 6 UNITS (ref 0–19)

## 2024-04-15 ENCOUNTER — TELEMEDICINE (OUTPATIENT)
Dept: MEDICAL GROUP | Facility: PHYSICIAN GROUP | Age: 74
End: 2024-04-15
Payer: COMMERCIAL

## 2024-04-15 VITALS — HEIGHT: 70 IN | BODY MASS INDEX: 26.9 KG/M2

## 2024-04-15 DIAGNOSIS — M06.041 RHEUMATOID ARTHRITIS INVOLVING BOTH HANDS WITH NEGATIVE RHEUMATOID FACTOR (HCC): ICD-10-CM

## 2024-04-15 DIAGNOSIS — E03.9 HYPOTHYROIDISM, UNSPECIFIED TYPE: ICD-10-CM

## 2024-04-15 DIAGNOSIS — E78.2 MIXED HYPERLIPIDEMIA: ICD-10-CM

## 2024-04-15 DIAGNOSIS — M06.042 RHEUMATOID ARTHRITIS INVOLVING BOTH HANDS WITH NEGATIVE RHEUMATOID FACTOR (HCC): ICD-10-CM

## 2024-04-15 PROCEDURE — 99214 OFFICE O/P EST MOD 30 MIN: CPT | Mod: 95 | Performed by: NURSE PRACTITIONER

## 2024-04-15 RX ORDER — LEVOTHYROXINE SODIUM 0.03 MG/1
25 TABLET ORAL
Qty: 90 TABLET | Refills: 0 | Status: SHIPPED | OUTPATIENT
Start: 2024-04-15

## 2024-04-15 RX ORDER — ROSUVASTATIN CALCIUM 5 MG/1
5 TABLET, COATED ORAL EVERY EVENING
Qty: 90 TABLET | Refills: 0 | Status: SHIPPED | OUTPATIENT
Start: 2024-04-15

## 2024-04-15 ASSESSMENT — ENCOUNTER SYMPTOMS
SHORTNESS OF BREATH: 0
NAUSEA: 0
FEVER: 0
DIARRHEA: 0
CONSTIPATION: 1
CHILLS: 0
ABDOMINAL PAIN: 1
WEIGHT LOSS: 0
VOMITING: 0

## 2024-04-15 NOTE — PROGRESS NOTES
"Verbal consent was acquired by the patient to use [employer] ambient listening note generation during this visit       Virtual Visit: Established Patient   This visit was conducted via Zoom using secure and encrypted videoconferencing technology.   The patient was in their home in the [facility].    The patient's identity was confirmed and verbal consent was obtained for this virtual visit.          CC:  Chief Complaint   Patient presents with    Lab Results       Marivel Enamorado 73 y.o. female  patient presenting for     History of Present Illness  The patient is a 73-year-old female who is presenting via virtual visit for a follow-up consultation regarding her blood work.    Constipation   The patient reports an improvement in her gastrointestinal symptoms. She initiated a regimen of Metamucil and MiraLAX today. The patient expresses a desire to transfer her order to [facility], as she now has the necessary procedures to be conducted in [place]. She is also inquiring about the availability of a healthcare provider at [place] to facilitate the colonoscopy.    Hypothyroid  New labs show elevation in TSH and decreased T4.     hyperlipidemia  The patient acknowledges the consumption of fatty foods, however, she admits to a high intake of cheese.      Review of Systems   Constitutional:  Negative for chills, fever, malaise/fatigue and weight loss.   Respiratory:  Negative for shortness of breath.    Cardiovascular:  Negative for chest pain.   Gastrointestinal:  Positive for abdominal pain and constipation. Negative for diarrhea, nausea and vomiting.          Objective:   Ht 1.778 m (5' 10\")   BMI 26.90 kg/m²     Physical Exam:  Constitutional: Alert, no distress, well-groomed.  Skin: No rashes in visible areas.  Eye: Round. Conjunctiva clear, lids normal. No icterus.   ENMT: Lips pink without lesions, good dentition, moist mucous membranes. Phonation normal.  Neck: No masses, no thyromegaly. Moves freely " without pain.  Respiratory: Unlabored respiratory effort, no cough or audible wheeze  Psych: Alert and oriented x3, normal affect and mood.     IMPRESSION:     1.  Multifocal arthritis with a radiographic appearance likely representing a combination of osteoarthritis and inflammatory/erosive arthritis consistent with the stated history of rheumatoid arthritis.  Results  Laboratory Studies 4/12/2024  TSH is 12.8 and T4 is 0.86. Total cholesterol is 214, triglycerides are 108, HDL is 53, LDL is 139.    Imaging  Hand x-ray on 4/12/2025 showed multifocal arthritis, potential osteoarthritis, inflammatory, and erosive arthritis.    Assessment and Plan      1. Hypothyroidism, unspecified type  Chronic and stable condition   - levothyroxine (SYNTHROID) 25 MCG Tab; Take 1 Tablet by mouth every morning on an empty stomach.  Dispense: 90 Tablet; Refill: 0  - TSH+FREE T4    2. Mixed hyperlipidemia  Chronic and stable condition   - rosuvastatin (CRESTOR) 5 MG Tab; Take 1 Tablet by mouth every evening.  Dispense: 90 Tablet; Refill: 0    3. Rheumatoid arthritis involving both hands with negative rheumatoid factor (HCC)  Chronic and stable condition   - Referral to Rheumatology       Discussed with patient possible alternative diagnoses, patient is to take all medications as prescribed.     If symptoms persist FU w/PCP, if symptoms worsen go to emergency room.     If experiencing any side effects from prescribed medications reports to the office immediately or go to emergency room.    Reviewed indication, dosage, usage and potential adverse effects of prescribed medications.     Reviewed risks and benefits of treatment plan. Patient verbalizes understanding of all instruction and verbally agrees to plan.    Return in about 4 weeks (around 5/13/2024) for follow up TSH.    This note was created using voice recognition software ([employer]). The accuracy of the dictation is limited by the abilities of the software. I have reviewed  the note prior to signing, however some errors in grammar and context are still possible. If you have any questions related to this note please do not hesitate to contact our office.

## 2024-04-18 ENCOUNTER — TELEPHONE (OUTPATIENT)
Dept: HEALTH INFORMATION MANAGEMENT | Facility: OTHER | Age: 74
End: 2024-04-18
Payer: COMMERCIAL

## 2024-10-02 ENCOUNTER — APPOINTMENT (OUTPATIENT)
Dept: MEDICAL GROUP | Facility: PHYSICIAN GROUP | Age: 74
End: 2024-10-02
Payer: COMMERCIAL

## 2024-10-02 VITALS
SYSTOLIC BLOOD PRESSURE: 138 MMHG | WEIGHT: 179.2 LBS | OXYGEN SATURATION: 98 % | HEIGHT: 70 IN | DIASTOLIC BLOOD PRESSURE: 88 MMHG | BODY MASS INDEX: 25.65 KG/M2 | RESPIRATION RATE: 14 BRPM | HEART RATE: 64 BPM | TEMPERATURE: 98.2 F

## 2024-10-02 DIAGNOSIS — E03.9 HYPOTHYROIDISM, UNSPECIFIED TYPE: ICD-10-CM

## 2024-10-02 DIAGNOSIS — F17.200 TOBACCO USE DISORDER: ICD-10-CM

## 2024-10-02 DIAGNOSIS — G45.9 TIA (TRANSIENT ISCHEMIC ATTACK): ICD-10-CM

## 2024-10-02 PROCEDURE — 3079F DIAST BP 80-89 MM HG: CPT | Performed by: NURSE PRACTITIONER

## 2024-10-02 PROCEDURE — 99214 OFFICE O/P EST MOD 30 MIN: CPT | Performed by: NURSE PRACTITIONER

## 2024-10-02 PROCEDURE — 3075F SYST BP GE 130 - 139MM HG: CPT | Performed by: NURSE PRACTITIONER

## 2024-10-02 RX ORDER — ATORVASTATIN CALCIUM 20 MG/1
20 TABLET, FILM COATED ORAL DAILY
Qty: 90 TABLET | Refills: 3 | Status: SHIPPED | OUTPATIENT
Start: 2024-10-02

## 2024-10-02 RX ORDER — LOSARTAN POTASSIUM 25 MG/1
25 TABLET ORAL DAILY
Qty: 90 TABLET | Refills: 0 | Status: SHIPPED | OUTPATIENT
Start: 2024-10-02

## 2024-10-02 RX ORDER — FENOFIBRATE 145 MG/1
145 TABLET, COATED ORAL DAILY
Qty: 90 TABLET | Refills: 3 | Status: SHIPPED | OUTPATIENT
Start: 2024-10-02

## 2024-10-02 RX ORDER — ASPIRIN 81 MG/1
81 TABLET, COATED ORAL
COMMUNITY
Start: 2024-09-23

## 2024-10-02 RX ORDER — ATORVASTATIN CALCIUM 20 MG/1
TABLET, FILM COATED ORAL
COMMUNITY
Start: 2024-09-23 | End: 2024-10-02 | Stop reason: SDUPTHER

## 2024-10-02 RX ORDER — LEVOTHYROXINE SODIUM 25 UG/1
25 TABLET ORAL
Qty: 90 TABLET | Refills: 3 | Status: SHIPPED | OUTPATIENT
Start: 2024-10-02

## 2024-10-02 RX ORDER — NICOTINE 21 MG/24HR
1 PATCH, TRANSDERMAL 24 HOURS TRANSDERMAL EVERY 24 HOURS
Qty: 45 PATCH | Refills: 0 | Status: SHIPPED | OUTPATIENT
Start: 2024-10-02

## 2024-10-02 RX ORDER — FENOFIBRATE 145 MG/1
145 TABLET, COATED ORAL DAILY
COMMUNITY
Start: 2024-09-23 | End: 2024-10-02 | Stop reason: SDUPTHER

## 2024-10-02 ASSESSMENT — ENCOUNTER SYMPTOMS
HEARTBURN: 0
FEVER: 0
PALPITATIONS: 0
WEAKNESS: 0
VOMITING: 0
DIARRHEA: 0
CHILLS: 0
EYES NEGATIVE: 1
WEIGHT LOSS: 0
SHORTNESS OF BREATH: 0
CONSTIPATION: 0
DIZZINESS: 0
NAUSEA: 0
HEADACHES: 0

## 2024-10-02 ASSESSMENT — FIBROSIS 4 INDEX: FIB4 SCORE: 1.02

## 2024-12-03 ENCOUNTER — TELEPHONE (OUTPATIENT)
Dept: NEUROLOGY | Facility: MEDICAL CENTER | Age: 74
End: 2024-12-03
Payer: COMMERCIAL

## 2024-12-03 ENCOUNTER — OFFICE VISIT (OUTPATIENT)
Dept: NEUROLOGY | Facility: MEDICAL CENTER | Age: 74
End: 2024-12-03
Attending: PSYCHIATRY & NEUROLOGY
Payer: MEDICARE

## 2024-12-03 ENCOUNTER — TELEPHONE (OUTPATIENT)
Dept: NEUROLOGY | Facility: MEDICAL CENTER | Age: 74
End: 2024-12-03

## 2024-12-03 VITALS
SYSTOLIC BLOOD PRESSURE: 122 MMHG | DIASTOLIC BLOOD PRESSURE: 70 MMHG | OXYGEN SATURATION: 100 % | RESPIRATION RATE: 16 BRPM | HEART RATE: 68 BPM | WEIGHT: 174.82 LBS | BODY MASS INDEX: 25.03 KG/M2 | HEIGHT: 70 IN | TEMPERATURE: 98.4 F

## 2024-12-03 DIAGNOSIS — G45.9 TIA (TRANSIENT ISCHEMIC ATTACK): ICD-10-CM

## 2024-12-03 PROCEDURE — 99212 OFFICE O/P EST SF 10 MIN: CPT | Performed by: PSYCHIATRY & NEUROLOGY

## 2024-12-03 PROCEDURE — 3074F SYST BP LT 130 MM HG: CPT | Performed by: PSYCHIATRY & NEUROLOGY

## 2024-12-03 PROCEDURE — 3078F DIAST BP <80 MM HG: CPT | Performed by: PSYCHIATRY & NEUROLOGY

## 2024-12-03 ASSESSMENT — PATIENT HEALTH QUESTIONNAIRE - PHQ9
CLINICAL INTERPRETATION OF PHQ2 SCORE: 2
5. POOR APPETITE OR OVEREATING: 0 - NOT AT ALL

## 2024-12-03 ASSESSMENT — FIBROSIS 4 INDEX: FIB4 SCORE: 1.03

## 2024-12-03 NOTE — TELEPHONE ENCOUNTER
NEUROLOGY PATIENT PRE-VISIT PLANNING     Patient was NOT contacted to complete PVP.  Note: Patient will not be contacted if there is no indication to call.     Patient Appointment is scheduled as: New Patient     Is visit type and length scheduled correctly? Yes    [employer] Patient is checked in Patient Demographics? Yes    3.   Is referral attached to visit? Yes    4. Were records received from referring provider? Yes    4. Patient was NOT contacted to have someone accompany them to visit.     5. Is this appointment scheduled as a Hospital Follow-Up?  No    6. Does the patient require any pre procedure or post procedure follow up? No    7. If any orders were placed at last visit or intended to be done for this visit do we have Results/Consult Notes? Yes  Labs - Labs were not ordered at last office visit.  Imaging - Imaging was not ordered at last office visit.  Referrals - No referrals were ordered at last office visit.  Note: If patient appointment is for lab or imaging review and patient did not complete the studies, check with provider if OK to reschedule patient until completed.    8. If patient appointment is for Botox - is order pended for provider? N/A    9. Was Plan Assessment from last Neurology Office Visit Reviewed?  Yes

## 2024-12-03 NOTE — PROGRESS NOTES
"[facility]    Date of Service: 12/03/24    Referred by: JAQUAN Birch  [address] [address]      Reason for referral  TIA    History of present illness (HPI)   Marivel Enamorado is a 74 y.o. female who is referred to my office by her PCP for evaluation of a TIA. History details as follow in her note:    PCP note dated 10/2/24:  \"9/20/2024 she experienced sudden slurred speech during dinner with her son and his girlfriend, along with a sensation in her right arm. Suspecting a mini stroke, she did not experience any pain. The following morning, she felt unstable and was taken to the emergency room by her son after her nephew the doctor told her she needed to go. After 24 hours of testing, including an MRI, she was discharged with no brain damage detected. She was advised to follow up with a cardiologist and neurologist.\"    She was in [place] on 9/20, having dinner and cleaning her son's apartment when she noticed problems with her speech and her mouth went numb and paralyzed. This lasted for 10 seconds but happened 3-4 times in a matter of minutes and then she went back to normal. She went to the hospital and said she had a complete stroke work up including CTA and echocardiogram, as well as MRI and all if it came back normal.  She was discharged on Lipitor and aspirin, and her PCP added Losartan.     She is not diabetic. She has been smoking since she was 20 years old. When she was young she used cocaine, speed. She denies any similar events since then, and she denies any symptoms at the moment.     Of note, the records from the outside hospital are not available to me.    Review of Systems (ROS)  Negative for: Fever, chills, chest pain, shortness of breath, cough, diarrhea, constipation, urinary frequency, dysuria, skin rash, swelling.  Positive for: slightly off balance.     Medical history  Past Medical History:   Diagnosis Date    Arthritis     Mini stroke  "   Uterine cancer when she was 30. No radiation or chemotherapy  Hypertension  Dyslipidemia      Surgical history  Past Surgical History:   Procedure Laterality Date    ABDOMINAL HYSTERECTOMY TOTAL      LUMPECTOMY       Breast implants x2    Relevant family history  Family History   Problem Relation Age of Onset    Diabetes Mother     Cancer Mother         Female cancer    Cancer Father         Lung and other cancer    Diabetes Brother     Cancer Brother         Throat cancer     No family history of stroke or epilepsy.       Social history  Social History     Tobacco Use    Smoking status: Every Day     Current packs/day: 1.00     Average packs/day: 1 pack/day for 53.7 years (53.7 ttl pk-yrs)     Types: Cigarettes     Start date: 4/6/1971    Smokeless tobacco: Never   Substance Use Topics    Alcohol use: Yes     Alcohol/week: 12.6 oz     Types: 21 Standard drinks or equivalent per week         Medications    Outpatient Medications Marked as Taking for the 12/3/24 encounter (Office Visit) with George Ring M.D.   Medication Sig Dispense Refill    ASPIRIN LOW DOSE 81 MG EC tablet Take 81 mg by mouth every day.      levothyroxine (SYNTHROID) 25 MCG Tab Take 1 Tablet by mouth every morning on an empty stomach. 90 Tablet 3    atorvastatin (LIPITOR) 20 MG Tab Take 1 Tablet by mouth every day. 90 Tablet 3    fenofibrate (TRICOR) 145 MG Tab Take 1 Tablet by mouth every day. 90 Tablet 3    nicotine (NICODERM) 21 MG/24HR PATCH 24 HR Place 1 Patch on the skin every 24 hours. 45 Patch 0    losartan (COZAAR) 25 MG Tab Take 1 Tablet by mouth every day. 90 Tablet 0         Physical exam    Vitals:    12/03/24 1313   BP: 122/70   Pulse: 68   Resp: 16   Temp: 36.9 °C (98.4 °F)   SpO2: 100%       Focused Neurological Exam  Alert and oriented to all spheres.  Speech fluency and comprehension are intact.  There are no cranial neuropathies.  No dysmetria, ataxia, or tremors.  Muscle tone, bulk, and strength are normal and  symmetric.  Normal sensation to light touch throughout.  Reflexes are 2+ in the patellar tendon, bilaterally.  The gait posture and movements are normal, without any form of support.       Depression Screening    Little interest or pleasure in doing things?  1 - several days  Feeling down, depressed , or hopeless? 1 - several days  Trouble falling or staying asleep, or sleeping too much?     Feeling tired or having little energy?  2 - more than half the days  Poor appetite or overeating?  0 - not at all  Feeling bad about yourself - or that you are a failure or have let yourself or your family down? 0 - not at all  Trouble concentrating on things, such as reading the newspaper or watching television? 1 - several days  Moving or speaking so slowly that other people could have noticed.  Or the opposite - being so fidgety or restless that you have been moving around a lot more than usual?  0 - not at all  Thoughts that you would be better off dead, or of hurting yourself?  0 - not at all  Patient Health Questionnaire Score:        If depressive symptoms identified deferred to follow up visit unless specifically addressed in assesment and plan.    Interpretation of PHQ-9 Total Score   Score Severity   1-4 No Depression   5-9 Mild Depression   10-14 Moderate Depression   15-19 Moderately Severe Depression   20-27 Severe Depression      Laboratory results  Lab Results   Component Value Date/Time    WBC 4.7 (L) 04/12/2024 11:55 AM    RBC 4.63 04/12/2024 11:55 AM    HEMOGLOBIN 13.6 04/12/2024 11:55 AM    HEMATOCRIT 41.7 04/12/2024 11:55 AM    MCV 90.1 04/12/2024 11:55 AM    MCH 29.4 04/12/2024 11:55 AM    MCHC 32.6 04/12/2024 11:55 AM    MPV 9.2 04/12/2024 11:55 AM          Lab Results   Component Value Date/Time    SODIUM 141 04/12/2024 11:55 AM    POTASSIUM 3.3 (L) 04/12/2024 11:55 AM    CHLORIDE 102 04/12/2024 11:55 AM    CO2 27 04/12/2024 11:55 AM    GLUCOSE 83 04/12/2024 11:55 AM    BUN 14 04/12/2024 11:55 AM     CREATININE 0.78 04/12/2024 11:55 AM         Latest Reference Range & Units 04/12/24 11:55   Cholesterol,Tot 100 - 199 mg/dL 214 (H)   Triglycerides 0 - 149 mg/dL 108   HDL >=40 mg/dL 53   LDL <100 mg/dL 139 (H)   (H): Data is abnormally high      Imaging results  No imaging available to review.        Impression  Briefly, 74 y.o. female with acute onset of intermittent symptoms related to left hemispheric dysfunction. Supposedly, stroke work up at outside hospital was unremarkable. Unclear if this was a true stroke, as she still feels like her balance is off since then. Seizures also in the differential. I will request my MA to obtain records from the outside institution and I will give her a call if she needs to follow up with me again.    Plan  1. TIA (transient ischemic attack)  Obtain the records from outside hospital in [place].  Continue with aspirin, lipitor.  Smoking cessation, counseling per PCP.     Numerous questions were answered to the best of my knowledge. The patient is in agreement with the plan. Return to the clinic as needed    ADMINISTRATIVE BILLING  I spent a total of 20 minutes on this patient encounter.    George Ring MD  Neurologist & Neuro-Oncologist  Department of Neurology and Oncology  [facility]   of Clinical Neurology  [facility]

## 2024-12-12 ENCOUNTER — TELEMEDICINE (OUTPATIENT)
Dept: MEDICAL GROUP | Facility: PHYSICIAN GROUP | Age: 74
End: 2024-12-12
Payer: COMMERCIAL

## 2024-12-12 VITALS — WEIGHT: 170 LBS | BODY MASS INDEX: 24.34 KG/M2 | HEIGHT: 70 IN

## 2024-12-12 DIAGNOSIS — R35.0 FREQUENT URINATION: ICD-10-CM

## 2024-12-12 PROCEDURE — 99213 OFFICE O/P EST LOW 20 MIN: CPT | Performed by: NURSE PRACTITIONER

## 2024-12-12 ASSESSMENT — ENCOUNTER SYMPTOMS
SHORTNESS OF BREATH: 0
HEADACHES: 0
DIZZINESS: 0
ABDOMINAL PAIN: 0
PALPITATIONS: 0
WEIGHT LOSS: 0
VOMITING: 0
FLANK PAIN: 0
FEVER: 0
CHILLS: 0
NAUSEA: 0

## 2024-12-12 ASSESSMENT — FIBROSIS 4 INDEX: FIB4 SCORE: 1.03

## 2024-12-12 NOTE — PROGRESS NOTES
Virtual Visit: Established Patient   This visit was conducted via Teams using secure and encrypted videoconferencing technology.   The patient was in their home in the [facility].    The patient's identity was confirmed and verbal consent was obtained for this virtual visit.     Subjective:   CC:   Chief Complaint   Patient presents with    Follow-Up     Bladder issues        Marivel Enamorado is a 74 y.o. female presenting for evaluation and management of:    Problem   Frequent Urination    Notes increase in urination since her TIA  States that she stops drinking during the day so she does not have to pee a lot at work  She notes stress incontinence   Gets up twice at night           ROS   Review of Systems   Constitutional:  Negative for chills, fever, malaise/fatigue and weight loss.   Respiratory:  Negative for shortness of breath.    Cardiovascular:  Negative for chest pain and palpitations.   Gastrointestinal:  Negative for abdominal pain, nausea and vomiting.   Genitourinary:  Positive for frequency and urgency. Negative for flank pain and hematuria.   Neurological:  Negative for dizziness and headaches.        Current medicines (including changes today)  Current Outpatient Medications   Medication Sig Dispense Refill    ASPIRIN LOW DOSE 81 MG EC tablet Take 81 mg by mouth every day.      levothyroxine (SYNTHROID) 25 MCG Tab Take 1 Tablet by mouth every morning on an empty stomach. 90 Tablet 3    atorvastatin (LIPITOR) 20 MG Tab Take 1 Tablet by mouth every day. 90 Tablet 3    fenofibrate (TRICOR) 145 MG Tab Take 1 Tablet by mouth every day. 90 Tablet 3    nicotine (NICODERM) 21 MG/24HR PATCH 24 HR Place 1 Patch on the skin every 24 hours. 45 Patch 0    losartan (COZAAR) 25 MG Tab Take 1 Tablet by mouth every day. 90 Tablet 0    Non Formulary Request Automatic BP cuff covered under patients insurance 1 Each 0    sennosides-docusate sodium (SENOKOT-S) 8.6-50 MG tablet Take 1 Tablet by mouth every day.  "(Patient not taking: Reported on 12/12/2024) 30 Tablet 11     No current facility-administered medications for this visit.       Patient Active Problem List    Diagnosis Date Noted    Frequent urination 12/12/2024    TIA (transient ischemic attack) 10/02/2024    Rheumatoid arthritis involving both hands (HCC) 04/11/2024    Localized swelling of left lower leg 04/11/2024    Skin lesions 04/11/2024    History of uterine cancer 04/11/2024    Tobacco use disorder 04/11/2024    Nicotine dependence, uncomplicated (Current Smoker) 04/11/2024        Objective:   Ht 1.778 m (5' 10\")   Wt 77.1 kg (170 lb)   BMI 24.39 kg/m²     Physical Exam:  Constitutional: Alert, no distress, well-groomed.  Skin: No rashes in visible areas.  Eye: Round. Conjunctiva clear, lids normal. No icterus.   ENMT: Lips pink without lesions, good dentition, moist mucous membranes. Phonation normal.  Neck: No masses, no thyromegaly. Moves freely without pain.  Respiratory: Unlabored respiratory effort, no cough or audible wheeze  Psych: Alert and oriented x3, normal affect and mood.     Assessment and Plan:   The following treatment plan was discussed:     1. Frequent urination  Chronic and stable condition  Discussed bladder training well pending urinalysis.  As well as pelvic floor strengthening for urinary stress incontinence  - URINALYSIS,CULTURE IF INDICATED; Future     Follow-up: Return for Pending urine test.           "

## 2025-01-17 DIAGNOSIS — F17.200 TOBACCO USE DISORDER: ICD-10-CM

## 2025-01-17 RX ORDER — NICOTINE 21 MG/24HR
PATCH, TRANSDERMAL 24 HOURS TRANSDERMAL
Qty: 42 PATCH | Refills: 0 | Status: SHIPPED | OUTPATIENT
Start: 2025-01-17

## 2025-01-18 NOTE — TELEPHONE ENCOUNTER
Received request via: Pharmacy    Was the patient seen in the last year in this department? Yes    Does the patient have an active prescription (recently filled or refills available) for medication(s) requested? No    Pharmacy Name: Pharmacy:   [facility] [address] [place] [address]     Does the patient have snf Plus and need 100-day supply? (This applies to ALL medications) Patient does not have SCP

## 2025-03-24 ENCOUNTER — TELEPHONE (OUTPATIENT)
Dept: MEDICAL GROUP | Facility: PHYSICIAN GROUP | Age: 75
End: 2025-03-24
Payer: COMMERCIAL

## 2025-03-25 DIAGNOSIS — H53.9 CHANGES IN VISION: ICD-10-CM

## 2025-03-25 NOTE — Clinical Note
Member Name: Marivel Enamorado   Member Number: [id number]   Reference Number: [id number]   Approved Services: Consultation   Approved Service Dates: 03/25/2025 - 03/25/2026   Requesting Provider: Kelley Rae   Requested Provider: Guy Marshall     Dear Marivel Kelsea:     The following medical service(s) requested by Kelley Rae have been approved:    Procedure Code Procedure Code Name Requested Quantity Approved Quantity Status   93332 (CPT®) MT OFFICE/OUTPATIENT NEW MODERATE MDM 45 MINUTES 1 1 Authorized   17339 (CPT®) MT OFFICE/OUTPATIENT ESTABLISHED MOD MDM 30 MIN 5 5 Authorized       Approved Quantity means the number of visits approved for medication treatments and/or medical services.    The services should be provided by Guy Marshall no later than 03/25/2026. Please contact the provider listed below with any questions.     Provider Information:  Guy Marshall  [phone]    Your plan benefit may require a deductible, co-payment or coinsurance for these services. This authorization does not guarantee [employer] [facility] will pay the claim for services that you receive. Payment by [employer] for these services is subject to the terms of your Evidence of Coverage or Summary Plan Description, your eligibility at the time of service, and confirmation of benefit coverage.    For any questions or additional information, please contact Customer Service:    [employer]  Customer Service: [phone] or toll free [phone]  TTY users dial: [phone]   Call Center Hours: Mon - Fri 7 AM to 8 PM PST   Office Hours: Mon - Fri 8 AM to 5 PM [facility]   E-mail: [email or web address]   Website: [email or web address]       This information is available for free in other languages. Please contact Customer Service at the phone number above for more information. [employer] complies with applicable Federal civil rights laws and does not discriminate on the basis of race, color, national origin, age, disability  or sex.      Sincerely,     Healthcare Utilization Management Department     Cc: Guy Rae

## 2025-03-25 NOTE — Clinical Note
REFERRAL APPROVAL NOTICE         Sent on March 25, 2025                   Marivel Enamorado  [address] [facility] [address]                   Dear Ms. Enamorado,    After a careful review of the medical information and benefit coverage, [facility] has processed your referral. See below for additional details.    If applicable, you must be actively enrolled with your insurance for coverage of the authorized service. If you have any questions regarding your coverage, please contact your insurance directly.    REFERRAL INFORMATION   Referral #:  [id number]  Referred-To Provider    Referred-By Provider:  Ophthalmology    JAQUAN Birch, EWA      [address] [facility] [address]  [facility] [address]  [phone] [address] [facility] [address]  [facility] [address] [phone]    Referral Start Date:  03/25/2025  Referral End Date:   03/25/2026             SCHEDULING  If you do not already have an appointment, please call [phone] to make an appointment.     MORE INFORMATION  If you do not already have a [employer] account, sign up at: [email or web address]  You can access your medical information, make appointments, see lab results, billing information, and more.  If you have questions regarding this referral, please contact  the [facility] Referrals department at:             [phone]. Monday - Friday 8:00AM - 5:00PM.     Sincerely,    [facility]

## 2025-03-25 NOTE — TELEPHONE ENCOUNTER
PT LVM wanting a referral for Cataract surgery to  Dr. Guy Marshall. Do you want to see pt for this?